# Patient Record
Sex: MALE | Race: WHITE | NOT HISPANIC OR LATINO | URBAN - METROPOLITAN AREA
[De-identification: names, ages, dates, MRNs, and addresses within clinical notes are randomized per-mention and may not be internally consistent; named-entity substitution may affect disease eponyms.]

---

## 2017-04-05 ENCOUNTER — NEW PATIENT (OUTPATIENT)
Dept: URBAN - METROPOLITAN AREA CLINIC 44 | Facility: CLINIC | Age: 67
End: 2017-04-05
Payer: MEDICARE

## 2017-04-05 DIAGNOSIS — H53.133 SUDDEN VISUAL LOSS, BILATERAL: ICD-10-CM

## 2017-04-05 PROCEDURE — 92004 COMPRE OPH EXAM NEW PT 1/>: CPT | Performed by: OPHTHALMOLOGY

## 2017-04-05 PROCEDURE — 92134 CPTRZ OPH DX IMG PST SGM RTA: CPT | Performed by: OPHTHALMOLOGY

## 2017-04-05 PROCEDURE — 92083 EXTENDED VISUAL FIELD XM: CPT | Performed by: OPHTHALMOLOGY

## 2017-04-05 ASSESSMENT — VISUAL ACUITY
OS: 20/20
OD: 20/30

## 2017-04-05 ASSESSMENT — KERATOMETRY
OD: 43.38
OS: 43.25

## 2017-04-05 ASSESSMENT — INTRAOCULAR PRESSURE
OD: 14
OS: 18

## 2017-07-06 ENCOUNTER — FOLLOW UP ESTABLISHED (OUTPATIENT)
Dept: URBAN - METROPOLITAN AREA CLINIC 44 | Facility: CLINIC | Age: 67
End: 2017-07-06
Payer: MEDICARE

## 2017-07-06 PROCEDURE — 92250 FUNDUS PHOTOGRAPHY W/I&R: CPT | Performed by: OPTOMETRIST

## 2017-07-06 PROCEDURE — 92014 COMPRE OPH EXAM EST PT 1/>: CPT | Performed by: OPTOMETRIST

## 2017-07-06 ASSESSMENT — INTRAOCULAR PRESSURE
OS: 18
OD: 18

## 2017-09-05 ENCOUNTER — FOLLOW UP ESTABLISHED (OUTPATIENT)
Dept: URBAN - METROPOLITAN AREA CLINIC 44 | Facility: CLINIC | Age: 67
End: 2017-09-05
Payer: MEDICARE

## 2017-09-05 DIAGNOSIS — H25.13 AGE-RELATED NUCLEAR CATARACT, BILATERAL: ICD-10-CM

## 2017-09-05 DIAGNOSIS — H35.62 RETINAL HEMORRHAGE, LEFT EYE: Primary | ICD-10-CM

## 2017-09-05 DIAGNOSIS — Z79.4 LONG TERM (CURRENT) USE OF INSULIN: ICD-10-CM

## 2017-09-05 PROCEDURE — 92014 COMPRE OPH EXAM EST PT 1/>: CPT | Performed by: OPTOMETRIST

## 2017-09-05 ASSESSMENT — VISUAL ACUITY
OD: 20/25
OS: 20/25

## 2017-09-05 ASSESSMENT — KERATOMETRY
OD: 43.25
OS: 43.13

## 2017-09-05 ASSESSMENT — INTRAOCULAR PRESSURE
OD: 17
OS: 18

## 2018-01-03 ENCOUNTER — FOLLOW UP ESTABLISHED (OUTPATIENT)
Dept: URBAN - METROPOLITAN AREA CLINIC 44 | Facility: CLINIC | Age: 68
End: 2018-01-03

## 2018-01-03 DIAGNOSIS — H52.4 PRESBYOPIA: Primary | ICD-10-CM

## 2018-01-03 PROCEDURE — 92015 DETERMINE REFRACTIVE STATE: CPT | Performed by: OPTOMETRIST

## 2018-01-03 ASSESSMENT — VISUAL ACUITY
OS: 20/25
OD: 20/30

## 2018-01-03 ASSESSMENT — KERATOMETRY
OD: 43.25
OS: 43.38

## 2018-08-01 ENCOUNTER — FOLLOW UP ESTABLISHED (OUTPATIENT)
Dept: URBAN - METROPOLITAN AREA CLINIC 44 | Facility: CLINIC | Age: 68
End: 2018-08-01
Payer: MEDICARE

## 2018-08-01 PROCEDURE — 92014 COMPRE OPH EXAM EST PT 1/>: CPT | Performed by: OPTOMETRIST

## 2018-08-01 PROCEDURE — 92133 CPTRZD OPH DX IMG PST SGM ON: CPT | Performed by: OPTOMETRIST

## 2018-08-01 ASSESSMENT — KERATOMETRY
OD: 43.13
OS: 43.00

## 2018-08-01 ASSESSMENT — INTRAOCULAR PRESSURE
OD: 17
OS: 17

## 2018-08-01 ASSESSMENT — VISUAL ACUITY
OS: 20/25
OD: 20/25

## 2018-10-02 ENCOUNTER — FOLLOW UP ESTABLISHED (OUTPATIENT)
Dept: URBAN - METROPOLITAN AREA CLINIC 44 | Facility: CLINIC | Age: 68
End: 2018-10-02
Payer: MEDICARE

## 2018-10-02 PROCEDURE — 92083 EXTENDED VISUAL FIELD XM: CPT | Performed by: OPTOMETRIST

## 2018-10-02 PROCEDURE — 92012 INTRM OPH EXAM EST PATIENT: CPT | Performed by: OPTOMETRIST

## 2018-10-02 PROCEDURE — 76514 ECHO EXAM OF EYE THICKNESS: CPT | Performed by: OPTOMETRIST

## 2018-10-02 ASSESSMENT — INTRAOCULAR PRESSURE
OD: 17
OS: 18

## 2019-02-15 ENCOUNTER — FOLLOW UP ESTABLISHED (OUTPATIENT)
Dept: URBAN - METROPOLITAN AREA CLINIC 44 | Facility: CLINIC | Age: 69
End: 2019-02-15
Payer: MEDICARE

## 2019-02-15 PROCEDURE — 92012 INTRM OPH EXAM EST PATIENT: CPT | Performed by: OPTOMETRIST

## 2019-02-15 ASSESSMENT — INTRAOCULAR PRESSURE
OS: 19
OD: 19

## 2019-03-08 ENCOUNTER — FOLLOW UP ESTABLISHED (OUTPATIENT)
Dept: URBAN - METROPOLITAN AREA CLINIC 44 | Facility: CLINIC | Age: 69
End: 2019-03-08
Payer: MEDICARE

## 2019-03-08 DIAGNOSIS — H25.813 COMBINED FORMS OF AGE-RELATED CATARACT, BILATERAL: Primary | ICD-10-CM

## 2019-03-08 DIAGNOSIS — E11.9 TYPE 2 DIABETES MELLITUS WITHOUT COMPLICATIONS: ICD-10-CM

## 2019-03-08 PROCEDURE — 92083 EXTENDED VISUAL FIELD XM: CPT | Performed by: OPTOMETRIST

## 2019-03-08 PROCEDURE — 92134 CPTRZ OPH DX IMG PST SGM RTA: CPT | Performed by: OPTOMETRIST

## 2019-03-08 PROCEDURE — 92014 COMPRE OPH EXAM EST PT 1/>: CPT | Performed by: OPTOMETRIST

## 2019-03-08 ASSESSMENT — KERATOMETRY
OD: 43.38
OS: 43.13

## 2019-03-08 ASSESSMENT — VISUAL ACUITY
OD: 20/30
OS: 20/30

## 2019-03-08 ASSESSMENT — INTRAOCULAR PRESSURE
OS: 14
OD: 14

## 2019-03-27 ENCOUNTER — Encounter (OUTPATIENT)
Dept: URBAN - METROPOLITAN AREA CLINIC 44 | Facility: CLINIC | Age: 69
End: 2019-03-27
Payer: MEDICARE

## 2019-03-27 DIAGNOSIS — Z01.818 ENCOUNTER FOR OTHER PREPROCEDURAL EXAMINATION: Primary | ICD-10-CM

## 2019-03-27 PROCEDURE — 99213 OFFICE O/P EST LOW 20 MIN: CPT

## 2019-03-28 ENCOUNTER — FOLLOW UP ESTABLISHED (OUTPATIENT)
Dept: URBAN - METROPOLITAN AREA CLINIC 44 | Facility: CLINIC | Age: 69
End: 2019-03-28
Payer: MEDICARE

## 2019-03-28 DIAGNOSIS — Z86.73 OLD CVA: ICD-10-CM

## 2019-03-28 DIAGNOSIS — H53.2 DIPLOPIA: ICD-10-CM

## 2019-03-28 PROCEDURE — 92012 INTRM OPH EXAM EST PATIENT: CPT | Performed by: OPHTHALMOLOGY

## 2019-03-28 ASSESSMENT — INTRAOCULAR PRESSURE
OS: 18
OD: 18
OD: 18
OS: 18

## 2019-04-04 ENCOUNTER — SURGERY (OUTPATIENT)
Dept: URBAN - METROPOLITAN AREA SURGERY 19 | Facility: SURGERY | Age: 69
End: 2019-04-04
Payer: MEDICARE

## 2019-04-04 PROCEDURE — 66984 XCAPSL CTRC RMVL W/O ECP: CPT | Performed by: OPHTHALMOLOGY

## 2019-04-05 ENCOUNTER — POST OP (OUTPATIENT)
Dept: URBAN - METROPOLITAN AREA CLINIC 44 | Facility: CLINIC | Age: 69
End: 2019-04-05

## 2019-04-05 PROCEDURE — 99024 POSTOP FOLLOW-UP VISIT: CPT | Performed by: OPTOMETRIST

## 2019-04-05 ASSESSMENT — INTRAOCULAR PRESSURE: OD: 12

## 2019-04-11 ENCOUNTER — POST OP (OUTPATIENT)
Dept: URBAN - METROPOLITAN AREA CLINIC 44 | Facility: CLINIC | Age: 69
End: 2019-04-11

## 2019-04-11 DIAGNOSIS — Z96.1 PRESENCE OF INTRAOCULAR LENS: Primary | ICD-10-CM

## 2019-04-11 PROCEDURE — 99024 POSTOP FOLLOW-UP VISIT: CPT | Performed by: OPTOMETRIST

## 2019-04-11 ASSESSMENT — VISUAL ACUITY
OS: 20/30
OD: 20/20

## 2019-04-11 ASSESSMENT — INTRAOCULAR PRESSURE
OD: 17
OD: 12
OS: 14

## 2019-04-18 ENCOUNTER — SURGERY (OUTPATIENT)
Dept: URBAN - METROPOLITAN AREA SURGERY 19 | Facility: SURGERY | Age: 69
End: 2019-04-18
Payer: MEDICARE

## 2019-04-18 PROCEDURE — 66984 XCAPSL CTRC RMVL W/O ECP: CPT | Performed by: OPHTHALMOLOGY

## 2019-04-19 ENCOUNTER — POST OP (OUTPATIENT)
Dept: URBAN - METROPOLITAN AREA CLINIC 44 | Facility: CLINIC | Age: 69
End: 2019-04-19

## 2019-04-19 PROCEDURE — 99024 POSTOP FOLLOW-UP VISIT: CPT | Performed by: OPTOMETRIST

## 2019-04-19 ASSESSMENT — INTRAOCULAR PRESSURE: OD: 18

## 2019-05-23 ENCOUNTER — POST OP (OUTPATIENT)
Dept: URBAN - METROPOLITAN AREA CLINIC 44 | Facility: CLINIC | Age: 69
End: 2019-05-23

## 2019-05-23 PROCEDURE — 92015 DETERMINE REFRACTIVE STATE: CPT | Performed by: OPTOMETRIST

## 2019-05-23 PROCEDURE — 99024 POSTOP FOLLOW-UP VISIT: CPT | Performed by: OPTOMETRIST

## 2019-05-23 ASSESSMENT — VISUAL ACUITY
OS: 20/20
OD: 20/25

## 2019-05-23 ASSESSMENT — INTRAOCULAR PRESSURE
OS: 19
OD: 20

## 2019-11-21 ENCOUNTER — FOLLOW UP ESTABLISHED (OUTPATIENT)
Dept: URBAN - METROPOLITAN AREA CLINIC 44 | Facility: CLINIC | Age: 69
End: 2019-11-21
Payer: MEDICARE

## 2019-11-21 DIAGNOSIS — Z79.84 LONG TERM (CURRENT) USE OF ORAL HYPOGLYCEMIC DRUGS: ICD-10-CM

## 2019-11-21 PROCEDURE — 92083 EXTENDED VISUAL FIELD XM: CPT | Performed by: OPTOMETRIST

## 2019-11-21 PROCEDURE — 92014 COMPRE OPH EXAM EST PT 1/>: CPT | Performed by: OPTOMETRIST

## 2019-11-21 ASSESSMENT — KERATOMETRY
OS: 43.25
OD: 43.13

## 2019-11-21 ASSESSMENT — VISUAL ACUITY
OD: 20/20
OS: 20/20

## 2019-11-21 ASSESSMENT — INTRAOCULAR PRESSURE
OD: 14
OS: 14

## 2020-05-21 ENCOUNTER — FOLLOW UP ESTABLISHED (OUTPATIENT)
Dept: URBAN - METROPOLITAN AREA CLINIC 44 | Facility: CLINIC | Age: 70
End: 2020-05-21
Payer: MEDICARE

## 2020-05-21 DIAGNOSIS — H40.023 OPEN ANGLE WITH BORDERLINE FINDINGS, HIGH RISK, BILATERAL: Primary | ICD-10-CM

## 2020-05-21 PROCEDURE — 92012 INTRM OPH EXAM EST PATIENT: CPT | Performed by: OPTOMETRIST

## 2020-05-21 ASSESSMENT — INTRAOCULAR PRESSURE
OD: 15
OS: 16

## 2020-11-19 ENCOUNTER — FOLLOW UP ESTABLISHED (OUTPATIENT)
Dept: URBAN - METROPOLITAN AREA CLINIC 44 | Facility: CLINIC | Age: 70
End: 2020-11-19
Payer: MEDICARE

## 2020-11-19 DIAGNOSIS — H40.1134 PRIMARY OPEN-ANGLE GLAUCOMA, INDETERMINATE, BILATERAL: Primary | ICD-10-CM

## 2020-11-19 PROCEDURE — 92014 COMPRE OPH EXAM EST PT 1/>: CPT | Performed by: OPTOMETRIST

## 2020-11-19 PROCEDURE — 92133 CPTRZD OPH DX IMG PST SGM ON: CPT | Performed by: OPTOMETRIST

## 2020-11-19 RX ORDER — LATANOPROST 50 UG/ML
0.005 % SOLUTION OPHTHALMIC
Qty: 1 | Refills: 5 | Status: INACTIVE
Start: 2020-11-19 | End: 2020-12-30

## 2020-11-19 ASSESSMENT — VISUAL ACUITY
OS: 20/25
OD: 20/25

## 2020-11-19 ASSESSMENT — INTRAOCULAR PRESSURE
OD: 17
OD: 14
OS: 17
OS: 15

## 2020-11-19 ASSESSMENT — KERATOMETRY
OD: 43.38
OS: 42.88

## 2020-12-30 ENCOUNTER — FOLLOW UP ESTABLISHED (OUTPATIENT)
Dept: URBAN - METROPOLITAN AREA CLINIC 44 | Facility: CLINIC | Age: 70
End: 2020-12-30
Payer: MEDICARE

## 2020-12-30 PROCEDURE — 92012 INTRM OPH EXAM EST PATIENT: CPT | Performed by: OPTOMETRIST

## 2020-12-30 RX ORDER — LATANOPROST 50 UG/ML
0.005 % SOLUTION OPHTHALMIC
Qty: 7.5 | Refills: 3 | Status: INACTIVE
Start: 2020-12-30 | End: 2021-04-27

## 2020-12-30 ASSESSMENT — INTRAOCULAR PRESSURE
OS: 10
OD: 10

## 2021-02-15 ENCOUNTER — FOLLOW UP ESTABLISHED (OUTPATIENT)
Dept: URBAN - METROPOLITAN AREA CLINIC 44 | Facility: CLINIC | Age: 71
End: 2021-02-15
Payer: MEDICARE

## 2021-02-15 DIAGNOSIS — H00.15 CHALAZION LEFT LOWER LID: Primary | ICD-10-CM

## 2021-02-15 PROCEDURE — 92285 EXTERNAL OCULAR PHOTOGRAPHY: CPT | Performed by: OPHTHALMOLOGY

## 2021-02-15 ASSESSMENT — INTRAOCULAR PRESSURE
OS: 11
OD: 11

## 2021-04-27 ENCOUNTER — OFFICE VISIT (OUTPATIENT)
Dept: URBAN - METROPOLITAN AREA CLINIC 44 | Facility: CLINIC | Age: 71
End: 2021-04-27
Payer: MEDICARE

## 2021-04-27 DIAGNOSIS — H53.462 HOMONYMOUS BILATERAL FIELD DEFECTS, LEFT SIDE: ICD-10-CM

## 2021-04-27 PROCEDURE — 99213 OFFICE O/P EST LOW 20 MIN: CPT | Performed by: OPTOMETRIST

## 2021-04-27 RX ORDER — LATANOPROST 50 UG/ML
0.005 % SOLUTION OPHTHALMIC
Qty: 7.5 | Refills: 3 | Status: INACTIVE
Start: 2021-04-27 | End: 2022-02-07

## 2021-04-27 ASSESSMENT — INTRAOCULAR PRESSURE
OS: 14
OD: 19
OD: 15
OS: 19

## 2021-04-27 NOTE — IMPRESSION/PLAN
Impression: Homonymous hemianopia of left side Plan: History of major stroke in 2016 and most recently was treated for mini stroke on 07/04/17. Has a neurologist that is managing stroke/stroke risk. Patient having significant difficulty with balance. Has been seen at [facility] for fresnel prism evaluation, but patient was not able to tolerate prisms. If patient wishes to try again, can refer back to [facility], but otherwise will monitor and repeat VF at next visit.

## 2021-04-27 NOTE — IMPRESSION/PLAN
Impression: Primary open-angle glaucoma, indeterminate, bilateral Plan: Cupping OU Family hx: none Pachymetry: 829/655 IOP: 19/19 Current meds: latanoprost QHS OU
OCT RNFL (11/20/20): OD 66 (thin overall, sup/inf thinning) OS 63 (thin overall, inf/sup thinning, bdl temp thinning) HVF (11/21/19): homonymous hemianopsia left side OU (reliable OU) **patient very hard of hearing and does not have a reliable ** IOP elevated since last visit, due to patient not being compliant with gtts. Explained the importance of using the drops daily. Poor compliance can lead to blindness. Patient states he has problems with his neck/ shoulders and has previously had trouble with the VF, but patient would like to try the VF at next visit due to feeling he has lost further peripheral vision. Recommend restart Latanoprost QHS OU. RTC 4 months for complete exam + 24-2 HVF + OCT RNFL.

## 2021-06-17 ENCOUNTER — OFFICE VISIT (OUTPATIENT)
Dept: URBAN - METROPOLITAN AREA CLINIC 44 | Facility: CLINIC | Age: 71
End: 2021-06-17
Payer: MEDICARE

## 2021-06-17 DIAGNOSIS — H01.029 SQUAMOUS BLEPHARITIS UNSPECIFIED EYE, UNSPECIFIED EYELID: ICD-10-CM

## 2021-06-17 PROCEDURE — 99213 OFFICE O/P EST LOW 20 MIN: CPT | Performed by: OPHTHALMOLOGY

## 2021-06-17 NOTE — IMPRESSION/PLAN
Impression: Chalazion left lower lid: H00.15. Plan: resolved. Cont' LH/WC. Okay to follow up with optometry for regular eye care.

## 2021-06-17 NOTE — IMPRESSION/PLAN
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 Plan: bilateral upper and lower eyelid blepharitis. Small MESHA chalazion, recommend LH/WC.

## 2021-08-24 ENCOUNTER — OFFICE VISIT (OUTPATIENT)
Dept: URBAN - METROPOLITAN AREA CLINIC 44 | Facility: CLINIC | Age: 71
End: 2021-08-24
Payer: MEDICARE

## 2021-08-24 PROCEDURE — 99214 OFFICE O/P EST MOD 30 MIN: CPT | Performed by: OPTOMETRIST

## 2021-08-24 ASSESSMENT — INTRAOCULAR PRESSURE
OS: 17
OD: 17

## 2021-08-24 ASSESSMENT — VISUAL ACUITY
OD: 20/25
OS: 20/25

## 2021-08-24 NOTE — IMPRESSION/PLAN
Impression: Primary open-angle glaucoma, indeterminate, bilateral Plan: Cupping OU Family hx: none Pachymetry: 018/294 IOP: 17/17 Current meds: latanoprost QHS OU
OCT RNFL (11/20/20): OD 66 (thin overall, sup/inf thinning) OS 63 (thin overall, inf/sup thinning, bdl temp thinning) HVF (11/21/19): homonymous hemianopsia left side OU (reliable OU) **patient very hard of hearing, hx of dementia, and does not have a reliable **. IOP okay. Unable to get VF or OCT today. Continue current meds. Will try to get diagnostic testing at next appt.   RTC 6 months for IOP + 24-2 HVF + OCT RNFL

## 2021-08-24 NOTE — IMPRESSION/PLAN
Impression: Homonymous hemianopia of left side Plan: History of major stroke in 2016 and most recently was treated for mini stroke on 07/04/17. Has a neurologist that is managing stroke/stroke risk. Patient having significant difficulty with balance. Has been seen at [facility] for fresnel prism evaluation, but patient was not able to tolerate prisms. Unable to get VF today. Follow up with PCP and/or neurology for CVA management.

## 2022-02-22 ENCOUNTER — OFFICE VISIT (OUTPATIENT)
Dept: URBAN - METROPOLITAN AREA CLINIC 44 | Facility: CLINIC | Age: 72
End: 2022-02-22
Payer: MEDICARE

## 2022-02-22 PROCEDURE — 99213 OFFICE O/P EST LOW 20 MIN: CPT | Performed by: OPTOMETRIST

## 2022-02-22 ASSESSMENT — INTRAOCULAR PRESSURE
OD: 14
OS: 14

## 2022-02-22 NOTE — IMPRESSION/PLAN
Impression: Primary open-angle glaucoma, indeterminate, bilateral Plan: Cupping OU Family hx: none Pachymetry: 653/560 IOP: 14/14 Current meds: latanoprost QHS OU
OCT RNFL (11/20/20): OD 66 (thin overall, sup/inf thinning) OS 63 (thin overall, inf/sup thinning, bdl temp thinning) HVF (11/21/19): homonymous hemianopsia left side OU (reliable OU) **patient very hard of hearing, hx of dementia, and does not have a reliable **. IOP okay. Unable to get VF or OCT today. Continue current meds. Will not be able to get VF in future.   RTC 6 months for complete exam

## 2022-08-23 ENCOUNTER — OFFICE VISIT (OUTPATIENT)
Dept: URBAN - METROPOLITAN AREA CLINIC 44 | Facility: CLINIC | Age: 72
End: 2022-08-23
Payer: MEDICARE

## 2022-08-23 DIAGNOSIS — H26.493 OTHER SECONDARY CATARACT, BILATERAL: ICD-10-CM

## 2022-08-23 DIAGNOSIS — Z79.84 LONG TERM (CURRENT) USE OF ORAL ANTIDIABETIC DRUGS: ICD-10-CM

## 2022-08-23 DIAGNOSIS — E11.9 TYPE 2 DIABETES MELLITUS WITHOUT COMPLICATIONS: ICD-10-CM

## 2022-08-23 DIAGNOSIS — H40.1134 PRIMARY OPEN-ANGLE GLAUCOMA, BILATERAL, INDETERMINATE STAGE: ICD-10-CM

## 2022-08-23 DIAGNOSIS — H53.462 HOMONYMOUS BILATERAL FIELD DEFECTS, LEFT SIDE: ICD-10-CM

## 2022-08-23 DIAGNOSIS — H04.123 TEAR FILM INSUFFICIENCY OF BILATERAL LACRIMAL GLANDS: Primary | ICD-10-CM

## 2022-08-23 PROCEDURE — 92134 CPTRZ OPH DX IMG PST SGM RTA: CPT | Performed by: OPTOMETRIST

## 2022-08-23 PROCEDURE — 99214 OFFICE O/P EST MOD 30 MIN: CPT | Performed by: OPTOMETRIST

## 2022-08-23 ASSESSMENT — INTRAOCULAR PRESSURE
OS: 13
OD: 12

## 2022-08-23 ASSESSMENT — VISUAL ACUITY
OD: 20/30
OS: 20/25

## 2022-08-23 ASSESSMENT — KERATOMETRY
OD: 43.50
OS: 43.38

## 2022-08-23 NOTE — IMPRESSION/PLAN
Impression: Primary open-angle glaucoma, indeterminate, bilateral Plan: Cupping OU Family hx: none Pachymetry: 049/366 IOP: 12/13 Current meds: latanoprost QHS OU
OCT RNFL (11/20/20): OD 66 (thin overall, sup/inf thinning) OS 63 (thin overall, inf/sup thinning, bdl temp thinning) HVF (11/21/19): homonymous hemianopsia left side OU (reliable OU) **patient very hard of hearing, hx of dementia, difficulty positioning in slit lamp, and does not have a reliable **. IOP okay. Unable to get OCT today. Continue current meds. Will not be able to get VF in future.   RTC 6 months for complete exam + OCT RNFL (if able)

## 2022-08-23 NOTE — IMPRESSION/PLAN
Impression: Homonymous hemianopia of left side Plan: History of major stroke in 2016 and most recently was treated for mini stroke on 07/04/17. Has a neurologist that is managing stroke/stroke risk. Patient having significant difficulty with balance. Has been seen at [facility] for fresnel prism evaluation, but patient was not able to tolerate prisms. Follow up with PCP and/or neurology for CVA management.

## 2023-01-17 ENCOUNTER — OFFICE VISIT (OUTPATIENT)
Dept: URBAN - METROPOLITAN AREA CLINIC 51 | Facility: CLINIC | Age: 73
End: 2023-01-17
Payer: MEDICARE

## 2023-01-17 DIAGNOSIS — H43.813 VITREOUS DEGENERATION, BILATERAL: ICD-10-CM

## 2023-01-17 DIAGNOSIS — H26.493 OTHER SECONDARY CATARACT, BILATERAL: ICD-10-CM

## 2023-01-17 DIAGNOSIS — E11.9 TYPE 2 DIABETES MELLITUS WITHOUT COMPLICATIONS: ICD-10-CM

## 2023-01-17 DIAGNOSIS — H04.123 TEAR FILM INSUFFICIENCY OF BILATERAL LACRIMAL GLANDS: ICD-10-CM

## 2023-01-17 DIAGNOSIS — H50.10 EXOTROPIA: ICD-10-CM

## 2023-01-17 DIAGNOSIS — H53.462 HOMONYMOUS BILATERAL FIELD DEFECTS, LEFT SIDE: ICD-10-CM

## 2023-01-17 DIAGNOSIS — F03.90 DEMENTIA: ICD-10-CM

## 2023-01-17 DIAGNOSIS — H40.1134 PRIMARY OPEN-ANGLE GLAUCOMA, BILATERAL, INDETERMINATE STAGE: Primary | ICD-10-CM

## 2023-01-17 DIAGNOSIS — H52.4 PRESBYOPIA: ICD-10-CM

## 2023-01-17 DIAGNOSIS — Z79.84 LONG TERM (CURRENT) USE OF ORAL ANTIDIABETIC DRUGS: ICD-10-CM

## 2023-01-17 PROCEDURE — 92014 COMPRE OPH EXAM EST PT 1/>: CPT | Performed by: OPTOMETRIST

## 2023-01-17 PROCEDURE — 92134 CPTRZ OPH DX IMG PST SGM RTA: CPT | Performed by: OPTOMETRIST

## 2023-01-17 PROCEDURE — 92133 CPTRZD OPH DX IMG PST SGM ON: CPT | Performed by: OPTOMETRIST

## 2023-01-17 ASSESSMENT — VISUAL ACUITY
OD: 20/40
OS: 20/30

## 2023-01-17 ASSESSMENT — INTRAOCULAR PRESSURE
OS: 14
OD: 14

## 2023-01-17 NOTE — IMPRESSION/PLAN
Impression: Other secondary cataract, bilateral: H26.493. Plan: Opacified capsules are not visually significant enough to warrant laser treatment at this time. Can discuss laser treatment in the future if vision worsens. Patient will monitor vision closely and contact our office with any changes/ worsening of vision. Will re-evaluate on return visit.

## 2023-01-17 NOTE — IMPRESSION/PLAN
Impression: Homonymous hemianopia of left side Plan: History of major stroke in 2016 and most recently was treated for mini stroke on 07/04/17. Has a neurologist that is managing stroke/stroke risk. Patient having significant difficulty with balance. Has been seen at [facility] for fresnel prism evaluation, but patient was not able to tolerate prisms.

## 2023-01-17 NOTE — IMPRESSION/PLAN
Impression: Dementia: F03.90. Plan: Recently diagnosed. Patients sister present for today's examination.

## 2023-01-17 NOTE — IMPRESSION/PLAN
Impression: Presbyopia: H52.4. Plan: No significant changes noted with today's prescription from current specs. Had patient compare old vs new, states the same. Continue with current SRx.

## 2023-01-17 NOTE — IMPRESSION/PLAN
Impression: Exotropia: H50.10. Plan: Educated patient and sister on today's findings. Not noticing any double vision. Patient to contact our office if noticing double vision.

## 2023-01-17 NOTE — IMPRESSION/PLAN
Impression: Tear film insufficiency of bilateral lacrimal glands: H04.123. fluctuating vision Plan: Discussed eyes do appear dry. Dryness can cause fluctuations in vision. Recommend AT's at least 2+ times per day or more OU.

## 2023-01-17 NOTE — IMPRESSION/PLAN
Duplicate Request.   Refills done 8/29/22.    Impression: Primary open-angle glaucoma, indeterminate, bilateral
RNFL OCT (01/17/2023) OD:  poor scan sup, abn inf ; OS: bdl sup and inf [facility] OCT (01/17/2023): abn OU Plan: IOPs today: 14/14 on Latanoprost
Reviewed today's findings with patient and sister. IOPs doing well on current regimen. Updated RNFL/[facility]CA OCT obtained today. **patient very hard of hearing, hx of dementia, difficulty positioning in slit lamp, and does not have a reliable **. Will not be able to get VF in future.   
Continue Latanoprost QHS OU

RTC in 6 months for CE